# Patient Record
Sex: MALE | Race: BLACK OR AFRICAN AMERICAN | NOT HISPANIC OR LATINO | URBAN - NONMETROPOLITAN AREA
[De-identification: names, ages, dates, MRNs, and addresses within clinical notes are randomized per-mention and may not be internally consistent; named-entity substitution may affect disease eponyms.]

---

## 2018-01-22 NOTE — PATIENT DISCUSSION
(V48.312) Vitreous degeneration, left eye - Assesment : Examination revealed a new posterior vitreous detachment OS. No holes or tears noted 360 OS with Extended Oph. - Plan : Condition explained and handout given on posterior vitreous detachment and risk factors discussed for retinal detachment development. Advised to call immediately with any vision changes, any increasein floaters, or seeing flashes, dark shadows, or a curtain/veil across vision. RTC in 1 month for Dilation OS, immediately if any of the warning symptoms discussed present.

## 2018-02-19 NOTE — PATIENT DISCUSSION
Condition explained and handout given on posterior vitreous detachment and risk factors discussed for retinal detachment development. Advised to call immediately with any vision changes, any increasein floaters, or seeing flashes, dark shadows, or a curtain/veil across vision.  Return 1Yr Exam.

## 2020-10-19 NOTE — PATIENT DISCUSSION
Monitor. Advised to call immediately with any vision changes, any increase in floaters, or seeing flashes, dark shadows, or a curtain/veil across vision.

## 2021-10-26 ENCOUNTER — IMPORTED ENCOUNTER (OUTPATIENT)
Dept: URBAN - NONMETROPOLITAN AREA CLINIC 1 | Facility: CLINIC | Age: 31
End: 2021-10-26

## 2021-10-26 PROBLEM — H52.13: Noted: 2021-10-26

## 2021-10-26 PROBLEM — H52.223: Noted: 2021-10-26

## 2021-10-26 PROCEDURE — S0620 ROUTINE OPHTHALMOLOGICAL EXA: HCPCS

## 2022-04-09 ASSESSMENT — VISUAL ACUITY
OS_SC: 20/40
OD_PH: 20/40-
OS_PH: 20/25
OD_SC: 20/50

## 2022-04-09 ASSESSMENT — TONOMETRY
OS_IOP_MMHG: 18
OD_IOP_MMHG: 18